# Patient Record
Sex: MALE
[De-identification: names, ages, dates, MRNs, and addresses within clinical notes are randomized per-mention and may not be internally consistent; named-entity substitution may affect disease eponyms.]

---

## 2024-05-07 ENCOUNTER — APPOINTMENT (OUTPATIENT)
Dept: ORTHOPEDIC SURGERY | Facility: CLINIC | Age: 64
End: 2024-05-07
Payer: COMMERCIAL

## 2024-05-07 VITALS — HEIGHT: 66 IN | BODY MASS INDEX: 34.55 KG/M2 | WEIGHT: 215 LBS

## 2024-05-07 DIAGNOSIS — Z78.9 OTHER SPECIFIED HEALTH STATUS: ICD-10-CM

## 2024-05-07 DIAGNOSIS — I10 ESSENTIAL (PRIMARY) HYPERTENSION: ICD-10-CM

## 2024-05-07 DIAGNOSIS — S50.01XA CONTUSION OF RIGHT ELBOW, INITIAL ENCOUNTER: ICD-10-CM

## 2024-05-07 DIAGNOSIS — S52.044A NONDISPLACED FRACTURE OF CORONOID PROCESS OF RIGHT ULNA, INITIAL ENCOUNTER FOR CLOSED FRACTURE: ICD-10-CM

## 2024-05-07 DIAGNOSIS — S50.11XA CONTUSION OF RIGHT FOREARM, INITIAL ENCOUNTER: ICD-10-CM

## 2024-05-07 PROBLEM — Z00.00 ENCOUNTER FOR PREVENTIVE HEALTH EXAMINATION: Status: ACTIVE | Noted: 2024-05-07

## 2024-05-07 PROCEDURE — 73070 X-RAY EXAM OF ELBOW: CPT | Mod: RT

## 2024-05-07 PROCEDURE — 99203 OFFICE O/P NEW LOW 30 MIN: CPT

## 2024-05-07 RX ORDER — ERGOCALCIFEROL 1.25 MG/1
1.25 MG CAPSULE, LIQUID FILLED ORAL
Refills: 0 | Status: ACTIVE | COMMUNITY

## 2024-05-07 RX ORDER — AMLODIPINE BESYLATE AND BENAZEPRIL HYDROCHLORIDE 10; 20 MG/1; MG/1
10-20 CAPSULE ORAL
Refills: 0 | Status: ACTIVE | COMMUNITY

## 2024-05-07 RX ORDER — MONTELUKAST SODIUM 10 MG/1
10 TABLET, FILM COATED ORAL
Refills: 0 | Status: ACTIVE | COMMUNITY

## 2024-05-07 RX ORDER — HYDROXYZINE HCL 10 MG
10 TABLET ORAL
Refills: 0 | Status: ACTIVE | COMMUNITY

## 2024-05-07 RX ORDER — FAMOTIDINE 20 MG/1
20 TABLET, FILM COATED ORAL
Refills: 0 | Status: ACTIVE | COMMUNITY

## 2024-05-07 RX ORDER — DOXAZOSIN MESYLATE 4 MG/1
4 TABLET, FILM COATED, EXTENDED RELEASE ORAL
Refills: 0 | Status: ACTIVE | COMMUNITY

## 2024-05-07 NOTE — ASSESSMENT
[FreeTextEntry1] : 2-week old injury to right elbow after bracing himself on stairs while falling.  Exam with medial epicondyle bruising and swelling.  Laxity with UCL stressing without pain.  X-rays reviewed showing possible nondisplaced avulsion fracture of the coronoid. - As he does not have pain and range of motion is full, advised to continue range of motion with limited strength use of the right arm over the next 3 to 4 weeks - Can use Tylenol or ice as needed for pain - Follow-up in 4 to 6 weeks if needed

## 2024-05-07 NOTE — PHYSICAL EXAM
[de-identified] : Constitutional: Well developed, well nourished, able to communicate Neuro: Normal sensation, No focal deficits Skin: Intact CV: Peripheral vascular exam grossly normal Pulm: No signs of respiratory distress Psych: Oriented, normal mood and affect

## 2024-05-07 NOTE — HISTORY OF PRESENT ILLNESS
[de-identified] : 05/07/2024: Patient is a 63 year y.o. male presenting for evaluation of right elbow. Patient reports injury on Sunday April 28th 2024, where he was walking down steps and midway down the steps he fell down a few stairs, grabbed the railing with his right arm to protect himself, and subsequently got injured on his elbow. Patient reports he applied ice once after injury, however, states that pain was not severe at time of injury or since then. Pt noticed bruise develop on medial aspect of elbow, around 1 week later and decided to present for eval. Pt admits to loss of right upper extremity strength, denies n/t is only experiencing soreness and states the pain does not radiate.

## 2024-05-07 NOTE — IMAGING
[de-identified] :  R elbow: - No obvious deformity - Mild swelling and contusion of medial epicondylar soft tissue - No pain with palpation over olecranon, radial head, medial epicondyle, or lateral epicondyle - ROM intact throughout flexion, extension, supination, pronation - 5/5 strength with elbow flexion, extension, supination, pronation. 5/5 strength with wrist flexion, extension  - Moderate laxity with UCL stressing without pain. Normal varus stressing - Negative Hook test - No pain with milking maneuver  - Distally neurovascularly intact   L elbow: - No obvious swelling, deformity, bruising - No pain with palpation over olecranon, radial head, medial epicondyle, or lateral epicondyle - ROM intact throughout flexion, extension, supination, pronation - 5/5 strength with elbow flexion, extension, supination, pronation. 5/5 strength with wrist flexion, extension  - No laxity with varus or valgus stressing - Distally neurovascularly intact   [Right] : right elbow [FreeTextEntry1] : appears to have nondisplaced healing fracture of the coronoid

## 2024-05-10 ENCOUNTER — APPOINTMENT (OUTPATIENT)
Dept: ORTHOPEDIC SURGERY | Facility: CLINIC | Age: 64
End: 2024-05-10

## 2024-06-18 ENCOUNTER — APPOINTMENT (OUTPATIENT)
Dept: ORTHOPEDIC SURGERY | Facility: CLINIC | Age: 64
End: 2024-06-18